# Patient Record
Sex: MALE | Race: BLACK OR AFRICAN AMERICAN | Employment: STUDENT | URBAN - METROPOLITAN AREA
[De-identification: names, ages, dates, MRNs, and addresses within clinical notes are randomized per-mention and may not be internally consistent; named-entity substitution may affect disease eponyms.]

---
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
## 2025-04-05 ENCOUNTER — HOSPITAL ENCOUNTER (EMERGENCY)
Facility: HOSPITAL | Age: 20
Discharge: HOME | End: 2025-04-05
Attending: EMERGENCY MEDICINE

## 2025-04-05 VITALS
HEART RATE: 62 BPM | BODY MASS INDEX: 17.27 KG/M2 | RESPIRATION RATE: 18 BRPM | HEIGHT: 67 IN | OXYGEN SATURATION: 99 % | SYSTOLIC BLOOD PRESSURE: 121 MMHG | TEMPERATURE: 98 F | DIASTOLIC BLOOD PRESSURE: 75 MMHG | WEIGHT: 110 LBS

## 2025-04-05 DIAGNOSIS — H61.22 IMPACTED CERUMEN OF LEFT EAR: Primary | ICD-10-CM

## 2025-04-05 PROCEDURE — 69210 REMOVE IMPACTED EAR WAX UNI: CPT

## 2025-04-06 NOTE — ED PROVIDER NOTES
Patient Seen in: [facility] Emergency Department    History     Chief Complaint   Patient presents with    Ear Problem Pain       HPI    Patient presents to the ED complaining of a clogged and slightly uncomfortable left ear.  Symptoms started yesterday.  Denies other complaints.    History reviewed. History reviewed. No pertinent past medical history.    History reviewed. History reviewed. No pertinent surgical history.      Medications :  Prescriptions Prior to Admission[1]     No family history on file.    Smoking Status:   Social History     Socioeconomic History    Marital status: Single   Tobacco Use    Smoking status: Never    Smokeless tobacco: Never   Vaping Use    Vaping status: Never Used   Substance and Sexual Activity    Alcohol use: Never    Drug use: Never       Constitutional and vital signs reviewed.      Social History and Family History elements reviewed from today, pertinent positives to the presenting problem noted.    Physical Exam     ED Triage Vitals [04/05/25 1357]   /75   Pulse 62   Resp 18   Temp 97.6 °F (36.4 °C)   Temp src Temporal   SpO2 99 %   O2 Device None (Room air)       All measures to prevent infection transmission during my interaction with the patient were taken. Handwashing was performed prior to and after the exam.  Stethoscope and any equipment used during my examination was cleaned with super sani-cloth germicidal wipes following the exam.     Physical Exam  Constitutional:       Appearance: Normal appearance.   HENT:      Right Ear: Tympanic membrane, ear canal and external ear normal.      Ears:      Comments: Left TM completely clogged with cerumen impaction.  Wax removed and normal TM following.  Pulmonary:      Effort: Pulmonary effort is normal. No respiratory distress.   Neurological:      Mental Status: He is alert.         ED Course      Labs Reviewed - No data to display    As Interpreted by me    Imaging Results Available and Reviewed while in ED:  No results found.  ED Medications Administered: Medications - No data to display      MDM     Vitals:    04/05/25 1357   BP: 121/75   Pulse: 62   Resp: 18   Temp: 97.6 °F (36.4 °C)   TempSrc: Temporal   SpO2: 99%   Weight: 49.9 kg   Height: 170.2 cm (5' 7\")     *I personally reviewed and interpreted all ED vitals.    Pulse Ox: 99%, Room air, Normal     Differential Diagnosis/ Diagnostic Considerations: Cerumen impaction, other    Complicating Factors: The patient already has does not have a problem list on file. to contribute to the complexity of this ED evaluation.    Medical Decision Making  Patient presents to the ED with a cerumen impaction.  Removed with warm water irrigation and a plastic ear curette.  Normal TM following.    Problems Addressed:  Impacted cerumen of left ear: acute illness or injury        Condition upon leaving the department: Stable    Disposition and Plan     Clinical Impression:  1. Impacted cerumen of left ear        Disposition:  Discharge    Follow-up:  Nonstaff, Physician  [address] [facility] [id number]    Schedule an appointment as soon as possible for a visit in 3 day(s)        Medications Prescribed:  Discharge Medication List as of 4/5/2025  3:19 PM                           [1] (Not in a hospital admission)

## (undated) NOTE — LETTER
January 17, 2018    Patient: Leighton Aponte   Date of Visit: 1/17/2018       To Whom It May Concern:    Leighton Aponte was seen and treated in our emergency department on 1/17/2018. He should not return to school until 1/19/18.     If you have any que

## (undated) NOTE — LETTER
December 5, 2017    Patient: Yobani Loyola   Date of Visit: 12/5/2017       To Whom It May Concern:    Yobani Loyola was seen and treated in our emergency department on 12/5/2017. He should not return to school until 12-7-17.     If you have any que

## (undated) NOTE — ED AVS SNAPSHOT
Lisa Sosa   MRN: [id number]    Department:  [facility] Emergency Department   Date of Visit:  12/5/2017           Disclosure     Insurance plans vary and the physician(s) referred by the ER may not be covered by your plan.  Please contact CARE PHYSICIAN AT ONCE OR RETURN IMMEDIATELY TO THE EMERGENCY DEPARTMENT. If you have been prescribed any medication(s), please fill your prescription right away and begin taking the medication(s) as directed.   If you believe that any of the medications

## (undated) NOTE — ED AVS SNAPSHOT
Asad Montiel   MRN: [id number]    Department:  [facility] Emergency Department   Date of Visit:  1/17/2018           Disclosure     Insurance plans vary and the physician(s) referred by the ER may not be covered by your plan.  Please contact CARE PHYSICIAN AT ONCE OR RETURN IMMEDIATELY TO THE EMERGENCY DEPARTMENT. If you have been prescribed any medication(s), please fill your prescription right away and begin taking the medication(s) as directed.   If you believe that any of the medications

## (undated) NOTE — LETTER
Date & Time: 9/27/2022, 9:45 AM  Patient: Lisandro Chen  Encounter Provider(s):    Jerry Velásquez MD       To Whom It May Concern:    Lisandro Chen was seen and treated in our department on 9/27/2022. He may return to work on 9/30/2022 .     If you have any questions or concerns, please do not hesitate to call.        _____________________________  Physician/APC Signature